# Patient Record
Sex: FEMALE | Race: WHITE | Employment: STUDENT | URBAN - METROPOLITAN AREA
[De-identification: names, ages, dates, MRNs, and addresses within clinical notes are randomized per-mention and may not be internally consistent; named-entity substitution may affect disease eponyms.]

---
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
## 2024-09-17 ENCOUNTER — OFFICE VISIT (OUTPATIENT)
Age: 24
End: 2024-09-17

## 2024-09-17 VITALS
DIASTOLIC BLOOD PRESSURE: 70 MMHG | TEMPERATURE: 97.3 F | SYSTOLIC BLOOD PRESSURE: 126 MMHG | HEART RATE: 102 BPM | HEIGHT: 66 IN | WEIGHT: 172 LBS | OXYGEN SATURATION: 99 % | BODY MASS INDEX: 27.64 KG/M2

## 2024-09-17 DIAGNOSIS — S51.831A PUNCTURE WOUND OF RIGHT FOREARM, INITIAL ENCOUNTER: Primary | ICD-10-CM

## 2024-09-17 DIAGNOSIS — S01.311A LACERATION OF RIGHT EAR, INITIAL ENCOUNTER: ICD-10-CM

## 2024-09-17 DIAGNOSIS — S50.812A: ICD-10-CM
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
## 2024-09-17 RX ORDER — MUPIROCIN 20 MG/G
OINTMENT TOPICAL
Qty: 30 G | Refills: 0 | Status: SHIPPED | OUTPATIENT
Start: 2024-09-17 | End: 2024-09-24

## (undated) DEVICE — GLOVE ORANGE PI 7   MSG9070

## (undated) DEVICE — Z INACTIVE PER BARD TRAY CATH W/ 16FR DRNGE BG STATLOK F STBL DEV W/

## (undated) DEVICE — TIP SCIS L5MM DBL ACT CRV DISP METZ

## (undated) DEVICE — TROCAR: Brand: KII FIOS FIRST ENTRY

## (undated) DEVICE — SYRINGE, LUER LOCK, 10ML: Brand: MEDLINE

## (undated) DEVICE — PACK PROCEDURE SURG GYN LAP CDS

## (undated) DEVICE — TROCAR ENDOSCP L100MM DIA5MM BLDELSS STBL SL THRD OPT VW

## (undated) DEVICE — PAD TBL OP RM TRENDELENBURG STATIC TORSO W/STRAPS

## (undated) DEVICE — GLOVE SURG SZ 65 THK91MIL LTX FREE SYN POLYISOPRENE

## (undated) DEVICE — NEEDLE INSUF L120MM ULT VERES ENDOPATH

## (undated) DEVICE — TROCAR ENDOSCP L100MM DIA5MM BLDELSS STBL SL OBT RADLUC

## (undated) DEVICE — GOWN SIRUS NONREIN LG W/TWL: Brand: MEDLINE INDUSTRIES, INC.

## (undated) DEVICE — PUMP SUC IRR TBNG L10FT W/ HNDPC ASSEMB STRYKEFLOW 2

## (undated) DEVICE — KIT SURG PREP POVIDONE IOD WET FOR UNIV USE SPNG STK

## (undated) DEVICE — SUTURE VCRL + SZ 4-0 L18IN ABSRB UD L19MM PS-2 3/8 CIR PRIM VCP496H

## (undated) DEVICE — DEVICE MANIP L330MM DIA4.5MM UTER DISP INJ ZINNANTI KRONNER

## (undated) DEVICE — TRAY PREP DRY W/ PREM GLV 2 APPL 6 SPNG 2 UNDPD 1 OVERWRAP

## (undated) DEVICE — SUTURE MCRYL SZ 4-0 L18IN ABSRB UD L19MM PS-2 3/8 CIR PRIM Y496G